# Patient Record
Sex: FEMALE | Race: OTHER | HISPANIC OR LATINO | URBAN - METROPOLITAN AREA
[De-identification: names, ages, dates, MRNs, and addresses within clinical notes are randomized per-mention and may not be internally consistent; named-entity substitution may affect disease eponyms.]

---

## 2021-05-08 ENCOUNTER — EMERGENCY (EMERGENCY)
Facility: HOSPITAL | Age: 31
LOS: 1 days | Discharge: HOME | End: 2021-05-08
Attending: EMERGENCY MEDICINE
Payer: COMMERCIAL

## 2021-05-08 VITALS
HEART RATE: 62 BPM | WEIGHT: 130.07 LBS | HEIGHT: 63 IN | SYSTOLIC BLOOD PRESSURE: 115 MMHG | RESPIRATION RATE: 18 BRPM | OXYGEN SATURATION: 99 % | DIASTOLIC BLOOD PRESSURE: 81 MMHG | TEMPERATURE: 98 F

## 2021-05-08 PROCEDURE — 99283 EMERGENCY DEPT VISIT LOW MDM: CPT | Mod: 25

## 2021-05-08 PROCEDURE — 12001 RPR S/N/AX/GEN/TRNK 2.5CM/<: CPT

## 2021-05-08 PROCEDURE — 99282 EMERGENCY DEPT VISIT SF MDM: CPT | Mod: 25

## 2021-05-08 NOTE — ED PROVIDER NOTE - OBJECTIVE STATEMENT
30 year old female no past history, tetanus vaccine updated 4 years ago presents for laceration to left palm.  States she was using a knife to separate 2 frozen burger patties when knife slipped, stabbing her hand.  Sent from urgent care for further evaluation.

## 2021-05-08 NOTE — ED PROVIDER NOTE - PHYSICAL EXAMINATION
GEN:   comfortable, in no apparent distress, AOx3  RESP:  non-labored, speaking in full sentences  ABD:   soft, non tender, no guarding  :   no cva tenderness  MSK:   no musculoskeletal tenderness, 5/5 strength, moving all extremities  SKIN:   dry, no rash 1.5cm laceration to palm of left hand.  Mildly gaping, no deep structure involvement, 5/5 strength upon isolation of all joints of hand / finger.  No sensory deficit, cap refill under 2 seconds.  NEURO:   AOx3, no focal weakness or loss of sensation, gait normal, GCS 15  PSYCH: calm, cooperative, no apparent risk to self and others

## 2021-05-08 NOTE — ED ADULT TRIAGE NOTE - CHIEF COMPLAINT QUOTE
sent by city MD for a 1.5 cm wound to the left hand , as per pt she was cooking and accidently hit the knife on her hand, lmp 4/26

## 2021-05-08 NOTE — ED PROVIDER NOTE - NSFOLLOWUPINSTRUCTIONS_ED_ALL_ED_FT

## 2021-05-08 NOTE — ED PROVIDER NOTE - PATIENT PORTAL LINK FT
You can access the FollowMyHealth Patient Portal offered by [facility] by registering at the following website: [email or web address]. By joining [employer]’s FollowMyHealth portal, you will also be able to view your health information using other applications (apps) compatible with our system.

## 2021-05-08 NOTE — ED PROVIDER NOTE - CLINICAL SUMMARY MEDICAL DECISION MAKING FREE TEXT BOX
30 year old female no past history, tetanus vaccine updated 4 years ago presents for laceration to left palm.  States she was using a knife to separate 2 frozen burger patties when knife slipped, stabbing her hand.  Sent from urgent care for further evaluation.  No deep structure involvement.  2 sutures placed.

## 2021-08-29 ENCOUNTER — TRANSCRIPTION ENCOUNTER (OUTPATIENT)
Age: 31
End: 2021-08-29

## 2021-09-25 ENCOUNTER — FORM ENCOUNTER (OUTPATIENT)
Age: 31
End: 2021-09-25

## 2021-10-19 PROBLEM — Z78.9 OTHER SPECIFIED HEALTH STATUS: Chronic | Status: ACTIVE | Noted: 2021-05-08

## 2021-10-22 ENCOUNTER — NON-APPOINTMENT (OUTPATIENT)
Age: 31
End: 2021-10-22

## 2021-10-22 PROBLEM — Z00.00 ENCOUNTER FOR PREVENTIVE HEALTH EXAMINATION: Status: ACTIVE | Noted: 2021-10-22

## 2021-10-25 ENCOUNTER — APPOINTMENT (OUTPATIENT)
Dept: ORTHOPEDIC SURGERY | Facility: CLINIC | Age: 31
End: 2021-10-25
Payer: COMMERCIAL

## 2021-10-25 DIAGNOSIS — M89.9 DISORDER OF BONE, UNSPECIFIED: ICD-10-CM

## 2021-10-25 PROCEDURE — 99204 OFFICE O/P NEW MOD 45 MIN: CPT

## 2021-10-26 PROBLEM — M89.9 BONE LESION: Status: ACTIVE | Noted: 2021-10-25

## 2021-10-26 NOTE — PHYSICAL EXAM
[General Appearance - Well-Appearing] : Well appearing [General Appearance - Alert] : Alert [General Appearance - In No Acute Distress] : in no acute distress [General Appearance - Well Nourished] : well nourished [General Appearance - Well Developed] : well developed [Normal Station and Gait] : gait and station were normal [FreeTextEntry1] : Left LE: \par Stands with good balance and control\par Skin clean and dry. \par No knee effusion. \par Mild swelling about the pes. \par Full AROM of knee. \par Reproducible anterior/retropatellar knee pain with compression and PROM. \par Mild TTP about pes bursa. \par No medial or lateral joint line pain. \par Mild TTP to deep palpation about the proximal fibula. \par Negative tinels. \par Stable collaterals. \par Soft compartments. \par No popliteal or inguinal lymphadenopathy. \par 2+ DP/PT pulses. \par \par \par Of note the patient has no tenderness at all in the area of the left fibular head/neck. [Tenderness] : tenderness [Swelling] : no swelling [Masses] : no masses [Normal Bilat. UE ROM:] : Full range of motion throughout the bilateral lower extremities. [Normal] : normal 2+ DP/PT [Normal Bilat Motor Strength] : 5/5 strength in bilateral  EHL, FHL, tibialis anterior, gastroc-soleus, peroneal, quadriceps, hamstrings and hip flexor

## 2021-10-26 NOTE — DISCUSSION/SUMMARY
[All Questions Answered] : Patient (and family) had all questions answered to an agreeable level of satisfaction [Interested in Proceeding] : Patient (and family) expressed understanding and interest in proceeding with the plan as outlined [de-identified] : Patient has benign-appearing incidentally found lesion.  My recommendation is to continue letting this be.  I do not think we need to do anything with it other than plan for observation.  She can have arthroscopy of her knee without any problems if this is deemed her best course of treatment.  She can see her regular orthopedic surgeon as necessary.  I will see her again in 6 months to 1 year with repeat x-ray.\par \par If imaging was ordered, the patient was told to make an appointment to review findings right after all imaging is completed.\par \par We discussed risks, benefits and alternatives. Rationale of care was reviewed and all questions were answered. Patient (and family) had all questions answered to her degree of the level of satisfaction. Patient (and family) expressed understanding and interest in proceeding with the plan as outlined.\par \par \par \par \par This note was done with a voice recognition transcription software and any typos are related to this rather than medical error. Surgical risks reviewed. Patient (and family) had all questions answered to an agreeable level of satisfaction. Patient (and family) expressed understanding and interest in proceeding with the plan as outlined.  \par

## 2021-10-26 NOTE — HISTORY OF PRESENT ILLNESS
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 She began with pain in 2017 and had an MRI at that time showing a small proximal fibula bone lesion that was consistent with likely an enchondroma. She has had persistent anterior knee pain related to long distance marathon running. Had a recent visit with Dr. Suarez and a repeat MRI was obtained suggesting a slightly increase in size of the enchondroma from her 2017 MRI. For this reason she was referred to me. Patient notes activity related anterior and occasionally medial knee pain. She does not describe any lateral knee pain. No paresthesias. No leg or ankle weakness. No foot drop. Denies any night pain. No personal history of cancer. Denies any fevers or chills. No other complaints.  [Physical Therapy] : not relieved by physical therapy [de-identified] : running

## 2021-10-26 NOTE — REVIEW OF SYSTEMS
[Arthralgias] : arthralgias [Joint Pain] : joint pain [Chills] : no chills [Fever] : no fever [Chest Pain] : no chest pain [Palpitations] : no palpitations [Dyspnea] : no shortness of breath [Cough] : no cough [Joint Stiffness] : no joint stiffness

## 2021-10-26 NOTE — CONSULT LETTER
[Dear  ___] : Dear  [unfilled], [FreeTextEntry2] : Rickie Braxton MD\par [address]    [address]\[place]   [address] [FreeTextEntry1] : \par After evaluating your patient and reviewing the studies presented we have come to a mutually agreeable plan. \par \par Please see my note below.\par \par Should you have further questions, please feel free to call and discuss the care of your patient.\par \par Thank you for the confidence of your referral.\par \par Sincerely, \par \par Lalo Levin MD \par Chief, Musculoskeletal Oncology \par [address], [address], \[place] [address] \par [id number]\par [phone]

## 2021-10-26 NOTE — DATA REVIEWED
[Imaging Present] : Present [de-identified] : MRI from September 26, 2021:\par Shows mild lateral patella tilt with some Hoffa's fat pad impingement incomplete discoid lateral meniscus 11 mm benign cartilage lesion the fibular neck with some calcification increased from prior 2017 study.\par \par The 2017 study was also reviewed from August 3, 2017 which showed the lesion in the fibular neck.  It was present before.  There is no new or old cortical breakthrough soft tissue mass or other bony reaction surrounding it.

## 2022-04-25 ENCOUNTER — APPOINTMENT (OUTPATIENT)
Dept: ORTHOPEDIC SURGERY | Facility: CLINIC | Age: 32
End: 2022-04-25

## 2022-08-16 ENCOUNTER — NON-APPOINTMENT (OUTPATIENT)
Age: 32
End: 2022-08-16